# Patient Record
Sex: MALE | Race: WHITE
[De-identification: names, ages, dates, MRNs, and addresses within clinical notes are randomized per-mention and may not be internally consistent; named-entity substitution may affect disease eponyms.]

---

## 2019-01-04 ENCOUNTER — HOSPITAL ENCOUNTER (EMERGENCY)
Dept: HOSPITAL 92 - ERS | Age: 29
Discharge: HOME | End: 2019-01-04
Payer: SELF-PAY

## 2019-01-04 DIAGNOSIS — E87.6: Primary | ICD-10-CM

## 2019-01-04 DIAGNOSIS — F32.9: ICD-10-CM

## 2019-01-04 DIAGNOSIS — F17.210: ICD-10-CM

## 2019-01-04 DIAGNOSIS — K04.7: ICD-10-CM

## 2019-01-04 DIAGNOSIS — I10: ICD-10-CM

## 2019-01-04 DIAGNOSIS — E86.0: ICD-10-CM

## 2019-01-04 DIAGNOSIS — F41.9: ICD-10-CM

## 2019-01-04 DIAGNOSIS — Z79.899: ICD-10-CM

## 2019-01-04 LAB
ALBUMIN SERPL BCG-MCNC: 4.4 G/DL (ref 3.5–5)
ALP SERPL-CCNC: 75 U/L (ref 40–150)
ALT SERPL W P-5'-P-CCNC: 13 U/L (ref 8–55)
ANION GAP SERPL CALC-SCNC: 15 MMOL/L (ref 10–20)
AST SERPL-CCNC: 20 U/L (ref 5–34)
BASOPHILS # BLD AUTO: 0 THOU/UL (ref 0–0.2)
BASOPHILS NFR BLD AUTO: 0.6 % (ref 0–1)
BILIRUB SERPL-MCNC: 1.1 MG/DL (ref 0.2–1.2)
BUN SERPL-MCNC: 10 MG/DL (ref 8.9–20.6)
CALCIUM SERPL-MCNC: 9.8 MG/DL (ref 7.8–10.44)
CHLORIDE SERPL-SCNC: 99 MMOL/L (ref 98–107)
CO2 SERPL-SCNC: 24 MMOL/L (ref 22–29)
CREAT CL PREDICTED SERPL C-G-VRATE: 0 ML/MIN (ref 70–130)
EOSINOPHIL # BLD AUTO: 0.1 THOU/UL (ref 0–0.7)
EOSINOPHIL NFR BLD AUTO: 0.6 % (ref 0–10)
GLOBULIN SER CALC-MCNC: 3.4 G/DL (ref 2.4–3.5)
GLUCOSE SERPL-MCNC: 117 MG/DL (ref 70–105)
HGB BLD-MCNC: 17.2 G/DL (ref 14–18)
LYMPHOCYTES # BLD: 1.6 THOU/UL (ref 1.2–3.4)
LYMPHOCYTES NFR BLD AUTO: 19.7 % (ref 21–51)
MCH RBC QN AUTO: 30.6 PG (ref 27–31)
MCV RBC AUTO: 88.4 FL (ref 78–98)
MONOCYTES # BLD AUTO: 0.6 THOU/UL (ref 0.11–0.59)
MONOCYTES NFR BLD AUTO: 6.9 % (ref 0–10)
NEUTROPHILS # BLD AUTO: 5.8 THOU/UL (ref 1.4–6.5)
NEUTROPHILS NFR BLD AUTO: 72.2 % (ref 42–75)
PLATELET # BLD AUTO: 215 THOU/UL (ref 130–400)
POTASSIUM SERPL-SCNC: 2.7 MMOL/L (ref 3.5–5.1)
RBC # BLD AUTO: 5.62 MILL/UL (ref 4.7–6.1)
SODIUM SERPL-SCNC: 135 MMOL/L (ref 136–145)
WBC # BLD AUTO: 8 THOU/UL (ref 4.8–10.8)

## 2019-01-04 PROCEDURE — 85025 COMPLETE CBC W/AUTO DIFF WBC: CPT

## 2019-01-04 PROCEDURE — 80053 COMPREHEN METABOLIC PANEL: CPT

## 2019-01-04 PROCEDURE — 93005 ELECTROCARDIOGRAM TRACING: CPT

## 2019-01-04 PROCEDURE — 96360 HYDRATION IV INFUSION INIT: CPT

## 2019-01-04 PROCEDURE — 36415 COLL VENOUS BLD VENIPUNCTURE: CPT

## 2019-05-01 ENCOUNTER — HOSPITAL ENCOUNTER (EMERGENCY)
Dept: HOSPITAL 9 - MADERS | Age: 29
Discharge: LEFT BEFORE DISCHARGE | End: 2019-05-01
Payer: SELF-PAY

## 2019-05-01 DIAGNOSIS — F17.210: ICD-10-CM

## 2019-05-01 DIAGNOSIS — R10.32: ICD-10-CM

## 2019-05-01 DIAGNOSIS — I10: ICD-10-CM
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
## 2019-05-01 DIAGNOSIS — F31.9: ICD-10-CM

## 2019-05-01 DIAGNOSIS — R00.0: ICD-10-CM

## 2019-05-01 DIAGNOSIS — R10.31: Primary | ICD-10-CM

## 2019-05-01 PROCEDURE — 99283 EMERGENCY DEPT VISIT LOW MDM: CPT

## 2020-07-27 ENCOUNTER — HOSPITAL ENCOUNTER (EMERGENCY)
Dept: HOSPITAL 92 - ERS | Age: 30
Discharge: HOME | End: 2020-07-27
Payer: SELF-PAY

## 2020-07-27 DIAGNOSIS — I10: ICD-10-CM

## 2020-07-27 DIAGNOSIS — F41.9: ICD-10-CM

## 2020-07-27 DIAGNOSIS — F31.9: ICD-10-CM

## 2020-07-27 DIAGNOSIS — F15.10: Primary | ICD-10-CM

## 2020-07-27 DIAGNOSIS — F17.210: ICD-10-CM

## 2020-07-27 LAB
ALBUMIN SERPL BCG-MCNC: 4 G/DL (ref 3.5–5)
ALP SERPL-CCNC: 75 U/L (ref 40–110)
ALT SERPL W P-5'-P-CCNC: 16 U/L (ref 8–55)
ANION GAP SERPL CALC-SCNC: 11 MMOL/L (ref 10–20)
APAP SERPL-MCNC: (no result) MCG/ML (ref 10–30)
AST SERPL-CCNC: 18 U/L (ref 5–34)
BASOPHILS # BLD AUTO: 0 THOU/UL (ref 0–0.2)
BASOPHILS NFR BLD AUTO: 0.3 % (ref 0–1)
BILIRUB SERPL-MCNC: 0.3 MG/DL (ref 0.2–1.2)
BUN SERPL-MCNC: 12 MG/DL (ref 8.9–20.6)
CALCIUM SERPL-MCNC: 8.5 MG/DL (ref 7.8–10.44)
CHLORIDE SERPL-SCNC: 107 MMOL/L (ref 98–107)
CK SERPL-CCNC: 99 U/L (ref 30–200)
CO2 SERPL-SCNC: 23 MMOL/L (ref 22–29)
CREAT CL PREDICTED SERPL C-G-VRATE: 0 ML/MIN (ref 70–130)
EOSINOPHIL # BLD AUTO: 0.1 THOU/UL (ref 0–0.7)
EOSINOPHIL NFR BLD AUTO: 1.6 % (ref 0–10)
GLOBULIN SER CALC-MCNC: 3 G/DL (ref 2.4–3.5)
GLUCOSE SERPL-MCNC: 94 MG/DL (ref 70–105)
HGB BLD-MCNC: 15.8 G/DL (ref 14–18)
LYMPHOCYTES # BLD: 1.6 THOU/UL (ref 1.2–3.4)
LYMPHOCYTES NFR BLD AUTO: 28.5 % (ref 21–51)
MAGNESIUM SERPL-MCNC: 2 MG/DL (ref 1.6–2.6)
MCH RBC QN AUTO: 32.6 PG (ref 27–31)
MCV RBC AUTO: 96.8 FL (ref 78–98)
MONOCYTES # BLD AUTO: 0.6 THOU/UL (ref 0.11–0.59)
MONOCYTES NFR BLD AUTO: 11.4 % (ref 0–10)
NEUTROPHILS # BLD AUTO: 3.2 THOU/UL (ref 1.4–6.5)
NEUTROPHILS NFR BLD AUTO: 58.3 % (ref 42–75)
PLATELET # BLD AUTO: 183 THOU/UL (ref 130–400)
POTASSIUM SERPL-SCNC: 4 MMOL/L (ref 3.5–5.1)
RBC # BLD AUTO: 4.86 MILL/UL (ref 4.7–6.1)
SALICYLATES SERPL-MCNC: (no result) MG/DL (ref 15–30)
SODIUM SERPL-SCNC: 137 MMOL/L (ref 136–145)
WBC # BLD AUTO: 5.5 THOU/UL (ref 4.8–10.8)

## 2020-07-27 PROCEDURE — 85025 COMPLETE CBC W/AUTO DIFF WBC: CPT

## 2020-07-27 PROCEDURE — 80053 COMPREHEN METABOLIC PANEL: CPT

## 2020-07-27 PROCEDURE — 71045 X-RAY EXAM CHEST 1 VIEW: CPT

## 2020-07-27 PROCEDURE — 93005 ELECTROCARDIOGRAM TRACING: CPT

## 2020-07-27 PROCEDURE — 82550 ASSAY OF CK (CPK): CPT

## 2020-07-27 PROCEDURE — 84484 ASSAY OF TROPONIN QUANT: CPT

## 2020-07-27 PROCEDURE — 80307 DRUG TEST PRSMV CHEM ANLYZR: CPT

## 2020-07-27 PROCEDURE — 36415 COLL VENOUS BLD VENIPUNCTURE: CPT

## 2020-07-27 PROCEDURE — 83605 ASSAY OF LACTIC ACID: CPT

## 2020-07-27 PROCEDURE — 83735 ASSAY OF MAGNESIUM: CPT

## 2020-07-27 NOTE — RAD
XR Chest 1 View Portable



HISTORY: Altered mental status.



COMPARISON: None.



FINDINGS: Heart size and mediastinum are within normal limits. The lungs are clear of infiltrates. No
 significant bony findings.



IMPRESSION: No active intrathoracic disease.



Reported By: Ren Villavicencio 

Electronically Signed:  7/27/2020 10:49 AM

## 2020-10-22 ENCOUNTER — HOSPITAL ENCOUNTER (EMERGENCY)
Dept: HOSPITAL 92 - ERS | Age: 30
Discharge: HOME | End: 2020-10-22
Payer: SELF-PAY

## 2020-10-22 DIAGNOSIS — Z79.899: ICD-10-CM

## 2020-10-22 DIAGNOSIS — S92.001A: ICD-10-CM

## 2020-10-22 DIAGNOSIS — I10: ICD-10-CM

## 2020-10-22 DIAGNOSIS — F17.210: ICD-10-CM
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
## 2020-10-22 DIAGNOSIS — F41.9: ICD-10-CM

## 2020-10-22 DIAGNOSIS — F90.9: ICD-10-CM

## 2020-10-22 DIAGNOSIS — F20.9: ICD-10-CM

## 2020-10-22 DIAGNOSIS — F32.9: ICD-10-CM

## 2020-10-22 DIAGNOSIS — S92.002A: Primary | ICD-10-CM

## 2020-10-22 PROCEDURE — 28400 CLTX CALCANEAL FX W/O MNPJ: CPT

## 2020-10-22 PROCEDURE — 96372 THER/PROPH/DIAG INJ SC/IM: CPT

## 2021-06-14 ENCOUNTER — HOSPITAL ENCOUNTER (EMERGENCY)
Dept: HOSPITAL 92 - ERS | Age: 31
LOS: 1 days | Discharge: HOME | End: 2021-06-15
Payer: SELF-PAY

## 2021-06-14 DIAGNOSIS — Z79.899: ICD-10-CM

## 2021-06-14 DIAGNOSIS — I10: ICD-10-CM

## 2021-06-14 DIAGNOSIS — F17.210: ICD-10-CM

## 2021-06-14 DIAGNOSIS — R44.3: Primary | ICD-10-CM

## 2021-06-14 LAB
ALBUMIN SERPL BCG-MCNC: 4.2 G/DL (ref 3.5–5)
ALP SERPL-CCNC: 97 U/L (ref 40–110)
ALT SERPL W P-5'-P-CCNC: 34 U/L (ref 8–55)
ANION GAP SERPL CALC-SCNC: 15 MMOL/L (ref 10–20)
APAP SERPL-MCNC: (no result) MCG/ML (ref 10–30)
AST SERPL-CCNC: 27 U/L (ref 5–34)
BASOPHILS # BLD AUTO: 0 THOU/UL (ref 0–0.2)
BASOPHILS NFR BLD AUTO: 0.4 % (ref 0–1)
BILIRUB SERPL-MCNC: 0.9 MG/DL (ref 0.2–1.2)
BUN SERPL-MCNC: 22 MG/DL (ref 8.9–20.6)
CALCIUM SERPL-MCNC: 8.9 MG/DL (ref 7.8–10.44)
CHLORIDE SERPL-SCNC: 104 MMOL/L (ref 98–107)
CK SERPL-CCNC: 663 U/L (ref 30–200)
CO2 SERPL-SCNC: 23 MMOL/L (ref 22–29)
CREAT CL PREDICTED SERPL C-G-VRATE: 0 ML/MIN (ref 70–130)
DRUG SCREEN CUTOFF: (no result)
EOSINOPHIL # BLD AUTO: 0 THOU/UL (ref 0–0.7)
EOSINOPHIL NFR BLD AUTO: 0.4 % (ref 0–10)
GLOBULIN SER CALC-MCNC: 3.5 G/DL (ref 2.4–3.5)
GLUCOSE SERPL-MCNC: 102 MG/DL (ref 70–105)
HGB BLD-MCNC: 15.4 G/DL (ref 14–18)
LYMPHOCYTES # BLD: 1.8 THOU/UL (ref 1.2–3.4)
LYMPHOCYTES NFR BLD AUTO: 17.3 % (ref 21–51)
MCH RBC QN AUTO: 30.8 PG (ref 27–31)
MCV RBC AUTO: 91.6 FL (ref 78–98)
MEDTOX CONTROL LINE VALID?: (no result)
MEDTOX READER #: (no result)
MONOCYTES # BLD AUTO: 1 THOU/UL (ref 0.11–0.59)
MONOCYTES NFR BLD AUTO: 9.5 % (ref 0–10)
NEUTROPHILS # BLD AUTO: 7.6 THOU/UL (ref 1.4–6.5)
NEUTROPHILS NFR BLD AUTO: 72.4 % (ref 42–75)
PLATELET # BLD AUTO: 234 THOU/UL (ref 130–400)
POTASSIUM SERPL-SCNC: 3.5 MMOL/L (ref 3.5–5.1)
RBC # BLD AUTO: 5 MILL/UL (ref 4.7–6.1)
SALICYLATES SERPL-MCNC: (no result) MG/DL (ref 15–30)
SODIUM SERPL-SCNC: 138 MMOL/L (ref 136–145)
WBC # BLD AUTO: 10.5 THOU/UL (ref 4.8–10.8)

## 2021-06-14 PROCEDURE — 84443 ASSAY THYROID STIM HORMONE: CPT

## 2021-06-14 PROCEDURE — 80307 DRUG TEST PRSMV CHEM ANLYZR: CPT

## 2021-06-14 PROCEDURE — 82550 ASSAY OF CK (CPK): CPT

## 2021-06-14 PROCEDURE — 80053 COMPREHEN METABOLIC PANEL: CPT

## 2021-06-14 PROCEDURE — 93005 ELECTROCARDIOGRAM TRACING: CPT

## 2021-06-14 PROCEDURE — 85025 COMPLETE CBC W/AUTO DIFF WBC: CPT

## 2021-06-14 PROCEDURE — 36416 COLLJ CAPILLARY BLOOD SPEC: CPT

## 2021-06-14 PROCEDURE — 80306 DRUG TEST PRSMV INSTRMNT: CPT

## 2021-06-14 PROCEDURE — 36415 COLL VENOUS BLD VENIPUNCTURE: CPT

## 2021-06-15 ENCOUNTER — HOSPITAL ENCOUNTER (INPATIENT)
Dept: HOSPITAL 92 - ERS | Age: 31
LOS: 1 days | Discharge: HOSPICE | DRG: 91 | End: 2021-06-16
Attending: INTERNAL MEDICINE | Admitting: INTERNAL MEDICINE
Payer: SELF-PAY

## 2021-06-15 VITALS — BODY MASS INDEX: 29.9 KG/M2

## 2021-06-15 DIAGNOSIS — Z51.5: ICD-10-CM

## 2021-06-15 DIAGNOSIS — E87.5: ICD-10-CM

## 2021-06-15 DIAGNOSIS — K72.90: ICD-10-CM

## 2021-06-15 DIAGNOSIS — F41.9: ICD-10-CM

## 2021-06-15 DIAGNOSIS — R44.3: ICD-10-CM

## 2021-06-15 DIAGNOSIS — I47.1: ICD-10-CM

## 2021-06-15 DIAGNOSIS — Z78.1: ICD-10-CM

## 2021-06-15 DIAGNOSIS — G93.89: ICD-10-CM

## 2021-06-15 DIAGNOSIS — F12.10: ICD-10-CM

## 2021-06-15 DIAGNOSIS — I10: ICD-10-CM

## 2021-06-15 DIAGNOSIS — E83.51: ICD-10-CM

## 2021-06-15 DIAGNOSIS — Z79.899: ICD-10-CM

## 2021-06-15 DIAGNOSIS — E83.41: ICD-10-CM

## 2021-06-15 DIAGNOSIS — E87.2: ICD-10-CM

## 2021-06-15 DIAGNOSIS — I46.9: ICD-10-CM

## 2021-06-15 DIAGNOSIS — J96.01: ICD-10-CM

## 2021-06-15 DIAGNOSIS — G93.1: Primary | ICD-10-CM

## 2021-06-15 DIAGNOSIS — Z20.822: ICD-10-CM

## 2021-06-15 DIAGNOSIS — Z66: ICD-10-CM

## 2021-06-15 DIAGNOSIS — N17.0: ICD-10-CM

## 2021-06-15 DIAGNOSIS — G93.40: ICD-10-CM

## 2021-06-15 DIAGNOSIS — F31.9: ICD-10-CM

## 2021-06-15 DIAGNOSIS — G40.909: ICD-10-CM

## 2021-06-15 DIAGNOSIS — Z88.8: ICD-10-CM

## 2021-06-15 DIAGNOSIS — E87.6: ICD-10-CM

## 2021-06-15 DIAGNOSIS — M62.82: ICD-10-CM

## 2021-06-15 DIAGNOSIS — E83.52: ICD-10-CM

## 2021-06-15 LAB
ALBUMIN SERPL BCG-MCNC: 3.8 G/DL (ref 3.5–5)
ALP SERPL-CCNC: 103 U/L (ref 40–110)
ALT SERPL W P-5'-P-CCNC: 137 U/L (ref 8–55)
ANALYZER IN CARDIO: (no result)
ANALYZER IN CARDIO: (no result)
ANION GAP SERPL CALC-SCNC: 18 MMOL/L (ref 10–20)
ANION GAP SERPL CALC-SCNC: 24 MMOL/L (ref 10–20)
ANION GAP SERPL CALC-SCNC: 46 MMOL/L (ref 10–20)
APAP SERPL-MCNC: (no result) MCG/ML (ref 10–30)
AST SERPL-CCNC: 195 U/L (ref 5–34)
BASE EXCESS STD BLDA CALC-SCNC: -29.7 MEQ/L
BASE EXCESS STD BLDA CALC-SCNC: -6.9 MEQ/L
BILIRUB SERPL-MCNC: 0.9 MG/DL (ref 0.2–1.2)
BUN SERPL-MCNC: 21 MG/DL (ref 8.9–20.6)
BUN SERPL-MCNC: 27 MG/DL (ref 8.9–20.6)
BUN SERPL-MCNC: 39 MG/DL (ref 8.9–20.6)
CA-I BLDA-SCNC: 0.94 MMOL/L (ref 1.12–1.3)
CA-I BLDA-SCNC: 1.25 MMOL/L (ref 1.12–1.3)
CALCIUM SERPL-MCNC: 12.1 MG/DL (ref 7.8–10.44)
CALCIUM SERPL-MCNC: 6.3 MG/DL (ref 7.8–10.44)
CALCIUM SERPL-MCNC: 7.3 MG/DL (ref 7.8–10.44)
CHLORIDE SERPL-SCNC: 103 MMOL/L (ref 98–107)
CHLORIDE SERPL-SCNC: 103 MMOL/L (ref 98–107)
CHLORIDE SERPL-SCNC: 98 MMOL/L (ref 98–107)
CK MB SERPL-MCNC: 3.1 NG/ML (ref 0–6.6)
CO2 SERPL-SCNC: 18 MMOL/L (ref 22–29)
CO2 SERPL-SCNC: 18 MMOL/L (ref 22–29)
CO2 SERPL-SCNC: 9 MMOL/L (ref 22–29)
CREAT CL PREDICTED SERPL C-G-VRATE: 0 ML/MIN (ref 70–130)
CREAT CL PREDICTED SERPL C-G-VRATE: 0 ML/MIN (ref 70–130)
CREAT CL PREDICTED SERPL C-G-VRATE: 54 ML/MIN (ref 70–130)
DRUG SCREEN CUTOFF: (no result)
GLOBULIN SER CALC-MCNC: 3 G/DL (ref 2.4–3.5)
GLUCOSE SERPL-MCNC: 152 MG/DL (ref 70–105)
GLUCOSE SERPL-MCNC: 67 MG/DL (ref 70–105)
GLUCOSE SERPL-MCNC: 90 MG/DL (ref 70–105)
HCO3 BLDA-SCNC: 16.4 MEQ/L (ref 22–28)
HCO3 BLDA-SCNC: 5.7 MEQ/L (ref 22–28)
HCT VFR BLDA CALC: 43 % (ref 42–52)
HCT VFR BLDA CALC: 49 % (ref 42–52)
HGB BLD-MCNC: 15.3 G/DL (ref 14–18)
HGB BLDA-MCNC: 14.5 G/DL (ref 14–18)
HGB BLDA-MCNC: 16.7 G/DL (ref 14–18)
MAGNESIUM SERPL-MCNC: 4.4 MG/DL (ref 1.6–2.6)
MCH RBC QN AUTO: 31.2 PG (ref 27–31)
MCV RBC AUTO: 98.8 FL (ref 78–98)
MDIFF COMPLETE?: YES
MEDTOX CONTROL LINE VALID?: (no result)
MEDTOX READER #: (no result)
MUCOUS THREADS UR QL AUTO: (no result) LPF
O2 A-A PPRESDIFF RESPIRATORY: 107.03 MMHG (ref 0–20)
PCO2 BLDA: 28.2 MMHG (ref 35–45)
PCO2 BLDA: 41.1 MMHG (ref 35–45)
PH BLDA: 6.76 [PH] (ref 7.35–7.45)
PH BLDA: 7.38 [PH] (ref 7.35–7.45)
PLATELET # BLD AUTO: 235 THOU/UL (ref 130–400)
PO2 BLDA: 174.4 MMHG (ref 80–100)
PO2 BLDA: 549.6 MMHG (ref 80–100)
POTASSIUM BLD-SCNC: 2.94 MMOL/L (ref 3.7–5.3)
POTASSIUM BLD-SCNC: 6.1 MMOL/L (ref 3.7–5.3)
POTASSIUM SERPL-SCNC: 2.7 MMOL/L (ref 3.5–5.1)
POTASSIUM SERPL-SCNC: 3.6 MMOL/L (ref 3.5–5.1)
POTASSIUM SERPL-SCNC: 5.6 MMOL/L (ref 3.5–5.1)
RBC # BLD AUTO: 4.92 MILL/UL (ref 4.7–6.1)
RBC UR QL AUTO: (no result) HPF (ref 0–3)
SALICYLATES SERPL-MCNC: (no result) MG/DL (ref 15–30)
SODIUM SERPL-SCNC: 136 MMOL/L (ref 136–145)
SODIUM SERPL-SCNC: 141 MMOL/L (ref 136–145)
SODIUM SERPL-SCNC: 147 MMOL/L (ref 136–145)
SP GR UR STRIP: 1.02 (ref 1–1.03)
SPECIMEN DRAWN FROM PATIENT: (no result)
SPECIMEN DRAWN FROM PATIENT: (no result)
TROPONIN I SERPL DL<=0.01 NG/ML-MCNC: 1.97 NG/ML (ref ?–0.03)
TROPONIN I SERPL DL<=0.01 NG/ML-MCNC: 15.89 NG/ML (ref ?–0.03)
WBC # BLD AUTO: 18.8 THOU/UL (ref 4.8–10.8)
WBC UR QL AUTO: (no result) HPF (ref 0–3)

## 2021-06-15 PROCEDURE — 96365 THER/PROPH/DIAG IV INF INIT: CPT

## 2021-06-15 PROCEDURE — 5A1945Z RESPIRATORY VENTILATION, 24-96 CONSECUTIVE HOURS: ICD-10-PCS | Performed by: EMERGENCY MEDICINE

## 2021-06-15 PROCEDURE — 5A2204Z RESTORATION OF CARDIAC RHYTHM, SINGLE: ICD-10-PCS | Performed by: EMERGENCY MEDICINE

## 2021-06-15 PROCEDURE — 5A12012 PERFORMANCE OF CARDIAC OUTPUT, SINGLE, MANUAL: ICD-10-PCS | Performed by: EMERGENCY MEDICINE

## 2021-06-15 PROCEDURE — 83605 ASSAY OF LACTIC ACID: CPT

## 2021-06-15 PROCEDURE — 96374 THER/PROPH/DIAG INJ IV PUSH: CPT

## 2021-06-15 PROCEDURE — 95712 VEEG 2-12 HR INTMT MNTR: CPT

## 2021-06-15 PROCEDURE — 95819 EEG AWAKE AND ASLEEP: CPT

## 2021-06-15 PROCEDURE — 82805 BLOOD GASES W/O2 SATURATION: CPT

## 2021-06-15 PROCEDURE — 80053 COMPREHEN METABOLIC PANEL: CPT

## 2021-06-15 PROCEDURE — 93005 ELECTROCARDIOGRAM TRACING: CPT

## 2021-06-15 PROCEDURE — 80307 DRUG TEST PRSMV CHEM ANLYZR: CPT

## 2021-06-15 PROCEDURE — U0005 INFEC AGEN DETEC AMPLI PROBE: HCPCS

## 2021-06-15 PROCEDURE — 3E033XZ INTRODUCTION OF VASOPRESSOR INTO PERIPHERAL VEIN, PERCUTANEOUS APPROACH: ICD-10-PCS | Performed by: EMERGENCY MEDICINE

## 2021-06-15 PROCEDURE — U0002 COVID-19 LAB TEST NON-CDC: HCPCS

## 2021-06-15 PROCEDURE — 87040 BLOOD CULTURE FOR BACTERIA: CPT

## 2021-06-15 PROCEDURE — 80306 DRUG TEST PRSMV INSTRMNT: CPT

## 2021-06-15 PROCEDURE — 36600 WITHDRAWAL OF ARTERIAL BLOOD: CPT

## 2021-06-15 PROCEDURE — 92950 HEART/LUNG RESUSCITATION CPR: CPT

## 2021-06-15 PROCEDURE — 82553 CREATINE MB FRACTION: CPT

## 2021-06-15 PROCEDURE — 83735 ASSAY OF MAGNESIUM: CPT

## 2021-06-15 PROCEDURE — 95957 EEG DIGITAL ANALYSIS: CPT

## 2021-06-15 PROCEDURE — 96366 THER/PROPH/DIAG IV INF ADDON: CPT

## 2021-06-15 PROCEDURE — 85025 COMPLETE CBC W/AUTO DIFF WBC: CPT

## 2021-06-15 PROCEDURE — 02HV33Z INSERTION OF INFUSION DEVICE INTO SUPERIOR VENA CAVA, PERCUTANEOUS APPROACH: ICD-10-PCS | Performed by: EMERGENCY MEDICINE

## 2021-06-15 PROCEDURE — 94002 VENT MGMT INPAT INIT DAY: CPT

## 2021-06-15 PROCEDURE — 99292 CRITICAL CARE ADDL 30 MIN: CPT

## 2021-06-15 PROCEDURE — 51702 INSERT TEMP BLADDER CATH: CPT

## 2021-06-15 PROCEDURE — 70450 CT HEAD/BRAIN W/O DYE: CPT

## 2021-06-15 PROCEDURE — 0D9670Z DRAINAGE OF STOMACH WITH DRAINAGE DEVICE, VIA NATURAL OR ARTIFICIAL OPENING: ICD-10-PCS | Performed by: EMERGENCY MEDICINE

## 2021-06-15 PROCEDURE — 82550 ASSAY OF CK (CPK): CPT

## 2021-06-15 PROCEDURE — S0028 INJECTION, FAMOTIDINE, 20 MG: HCPCS

## 2021-06-15 PROCEDURE — 31500 INSERT EMERGENCY AIRWAY: CPT

## 2021-06-15 PROCEDURE — 36556 INSERT NON-TUNNEL CV CATH: CPT

## 2021-06-15 PROCEDURE — 84484 ASSAY OF TROPONIN QUANT: CPT

## 2021-06-15 PROCEDURE — 94003 VENT MGMT INPAT SUBQ DAY: CPT

## 2021-06-15 PROCEDURE — 81003 URINALYSIS AUTO W/O SCOPE: CPT

## 2021-06-15 PROCEDURE — 71045 X-RAY EXAM CHEST 1 VIEW: CPT

## 2021-06-15 PROCEDURE — 0BH17EZ INSERTION OF ENDOTRACHEAL AIRWAY INTO TRACHEA, VIA NATURAL OR ARTIFICIAL OPENING: ICD-10-PCS | Performed by: EMERGENCY MEDICINE

## 2021-06-15 PROCEDURE — 36415 COLL VENOUS BLD VENIPUNCTURE: CPT

## 2021-06-15 RX ADMIN — NITROGLYCERIN SCH INCH: 20 OINTMENT TOPICAL at 21:57

## 2021-06-15 RX ADMIN — LEVETIRACETAM SCH MLS: 15 INJECTION INTRAVENOUS at 21:01

## 2021-06-15 RX ADMIN — Medication SCH: at 12:52

## 2021-06-15 RX ADMIN — FAMOTIDINE SCH MG: 10 INJECTION, SOLUTION INTRAVENOUS at 11:35

## 2021-06-15 RX ADMIN — Medication SCH ML: at 21:01

## 2021-06-16 ENCOUNTER — HOSPITAL ENCOUNTER (INPATIENT)
Dept: HOSPITAL 92 - CCU | Age: 31
DRG: 951 | End: 2021-06-16
Payer: COMMERCIAL

## 2021-06-16 VITALS — TEMPERATURE: 102 F

## 2021-06-16 VITALS — SYSTOLIC BLOOD PRESSURE: 141 MMHG | DIASTOLIC BLOOD PRESSURE: 90 MMHG

## 2021-06-16 DIAGNOSIS — G40.909: ICD-10-CM

## 2021-06-16 DIAGNOSIS — E83.52: ICD-10-CM

## 2021-06-16 DIAGNOSIS — G93.1: ICD-10-CM

## 2021-06-16 DIAGNOSIS — Z66: ICD-10-CM

## 2021-06-16 DIAGNOSIS — I46.8: ICD-10-CM

## 2021-06-16 DIAGNOSIS — N17.0: ICD-10-CM

## 2021-06-16 DIAGNOSIS — F19.10: ICD-10-CM

## 2021-06-16 DIAGNOSIS — M62.82: ICD-10-CM

## 2021-06-16 DIAGNOSIS — F31.9: ICD-10-CM

## 2021-06-16 DIAGNOSIS — J96.01: ICD-10-CM

## 2021-06-16 DIAGNOSIS — Z51.5: Primary | ICD-10-CM

## 2021-06-16 DIAGNOSIS — R44.3: ICD-10-CM

## 2021-06-16 DIAGNOSIS — E87.2: ICD-10-CM

## 2021-06-16 DIAGNOSIS — E87.5: ICD-10-CM

## 2021-06-16 LAB
ALBUMIN SERPL BCG-MCNC: 2.8 G/DL (ref 3.5–5)
ALP SERPL-CCNC: 125 U/L (ref 40–110)
ALT SERPL W P-5'-P-CCNC: 1250 U/L (ref 8–55)
ANALYZER IN CARDIO: (no result)
ANION GAP SERPL CALC-SCNC: 16 MMOL/L (ref 10–20)
AST SERPL-CCNC: (no result) U/L (ref 5–34)
BASE EXCESS STD BLDA CALC-SCNC: -1.4 MEQ/L
BASOPHILS # BLD AUTO: 0 THOU/UL (ref 0–0.2)
BASOPHILS NFR BLD AUTO: 0.2 % (ref 0–1)
BILIRUB SERPL-MCNC: 1.3 MG/DL (ref 0.2–1.2)
BUN SERPL-MCNC: 49 MG/DL (ref 8.9–20.6)
CA-I BLDA-SCNC: 0.85 MMOL/L (ref 1.12–1.3)
CALCIUM SERPL-MCNC: 6.3 MG/DL (ref 7.8–10.44)
CHLORIDE SERPL-SCNC: 101 MMOL/L (ref 98–107)
CK SERPL-CCNC: (no result) U/L (ref 30–200)
CO2 SERPL-SCNC: 22 MMOL/L (ref 22–29)
CREAT CL PREDICTED SERPL C-G-VRATE: 38 ML/MIN (ref 70–130)
EOSINOPHIL # BLD AUTO: 0.1 THOU/UL (ref 0–0.7)
EOSINOPHIL NFR BLD AUTO: 0.3 % (ref 0–10)
GLOBULIN SER CALC-MCNC: 2.5 G/DL (ref 2.4–3.5)
GLUCOSE SERPL-MCNC: 126 MG/DL (ref 70–105)
HCO3 BLDA-SCNC: 20 MEQ/L (ref 22–28)
HCT VFR BLDA CALC: 45 % (ref 42–52)
HGB BLD-MCNC: 14.6 G/DL (ref 14–18)
HGB BLDA-MCNC: 15.2 G/DL (ref 14–18)
LYMPHOCYTES # BLD: 0.7 THOU/UL (ref 1.2–3.4)
LYMPHOCYTES NFR BLD AUTO: 4.2 % (ref 21–51)
MCH RBC QN AUTO: 31.4 PG (ref 27–31)
MCV RBC AUTO: 92.5 FL (ref 78–98)
MONOCYTES # BLD AUTO: 0.4 THOU/UL (ref 0.11–0.59)
MONOCYTES NFR BLD AUTO: 2.5 % (ref 0–10)
NEUTROPHILS # BLD AUTO: 16.6 THOU/UL (ref 1.4–6.5)
NEUTROPHILS NFR BLD AUTO: 92.9 % (ref 42–75)
O2 A-A PPRESDIFF RESPIRATORY: 181.07 MMHG (ref 0–20)
PCO2 BLDA: 26.1 MMHG (ref 35–45)
PH BLDA: 7.5 [PH] (ref 7.35–7.45)
PLATELET # BLD AUTO: 122 THOU/UL (ref 130–400)
PO2 BLDA: 71.5 MMHG (ref 80–100)
POTASSIUM BLD-SCNC: 3.52 MMOL/L (ref 3.7–5.3)
POTASSIUM SERPL-SCNC: 2.9 MMOL/L (ref 3.5–5.1)
RBC # BLD AUTO: 4.65 MILL/UL (ref 4.7–6.1)
SODIUM SERPL-SCNC: 136 MMOL/L (ref 136–145)
SPECIMEN DRAWN FROM PATIENT: (no result)
TROPONIN I SERPL DL<=0.01 NG/ML-MCNC: 37.54 NG/ML (ref ?–0.03)
WBC # BLD AUTO: 17.9 THOU/UL (ref 4.8–10.8)

## 2021-06-16 RX ADMIN — FAMOTIDINE SCH MG: 10 INJECTION, SOLUTION INTRAVENOUS at 08:27

## 2021-06-16 RX ADMIN — NITROGLYCERIN SCH INCH: 20 OINTMENT TOPICAL at 03:04

## 2021-06-16 RX ADMIN — NITROGLYCERIN SCH: 20 OINTMENT TOPICAL at 14:00

## 2021-06-16 RX ADMIN — NITROGLYCERIN SCH INCH: 20 OINTMENT TOPICAL at 08:26

## 2021-06-16 RX ADMIN — LEVETIRACETAM SCH MLS: 15 INJECTION INTRAVENOUS at 08:27

## 2021-06-16 RX ADMIN — Medication SCH ML: at 08:40
